# Patient Record
Sex: FEMALE | Race: WHITE | NOT HISPANIC OR LATINO
[De-identification: names, ages, dates, MRNs, and addresses within clinical notes are randomized per-mention and may not be internally consistent; named-entity substitution may affect disease eponyms.]

---

## 2018-02-14 ENCOUNTER — RESULT REVIEW (OUTPATIENT)
Age: 48
End: 2018-02-14

## 2019-03-12 ENCOUNTER — RESULT REVIEW (OUTPATIENT)
Age: 49
End: 2019-03-12

## 2020-09-23 ENCOUNTER — RESULT REVIEW (OUTPATIENT)
Age: 50
End: 2020-09-23

## 2021-12-14 ENCOUNTER — RESULT REVIEW (OUTPATIENT)
Age: 51
End: 2021-12-14

## 2022-10-19 DIAGNOSIS — Z80.9 FAMILY HISTORY OF MALIGNANT NEOPLASM, UNSPECIFIED: ICD-10-CM

## 2022-10-19 DIAGNOSIS — Z86.19 PERSONAL HISTORY OF OTHER INFECTIOUS AND PARASITIC DISEASES: ICD-10-CM

## 2022-10-19 DIAGNOSIS — L60.3 NAIL DYSTROPHY: ICD-10-CM

## 2022-10-19 DIAGNOSIS — Z83.3 FAMILY HISTORY OF DIABETES MELLITUS: ICD-10-CM

## 2022-10-19 DIAGNOSIS — Z86.39 PERSONAL HISTORY OF OTHER ENDOCRINE, NUTRITIONAL AND METABOLIC DISEASE: ICD-10-CM

## 2022-10-19 DIAGNOSIS — Z82.49 FAMILY HISTORY OF ISCHEMIC HEART DISEASE AND OTHER DISEASES OF THE CIRCULATORY SYSTEM: ICD-10-CM

## 2022-10-19 PROBLEM — Z00.00 ENCOUNTER FOR PREVENTIVE HEALTH EXAMINATION: Status: ACTIVE | Noted: 2022-10-19

## 2022-11-12 ENCOUNTER — APPOINTMENT (OUTPATIENT)
Dept: PODIATRY | Facility: CLINIC | Age: 52
End: 2022-11-12

## 2022-11-12 DIAGNOSIS — M79.672 PAIN IN LEFT FOOT: ICD-10-CM

## 2022-11-12 DIAGNOSIS — M76.72 PERONEAL TENDINITIS, LEFT LEG: ICD-10-CM

## 2022-11-12 PROCEDURE — 99203 OFFICE O/P NEW LOW 30 MIN: CPT

## 2022-11-12 PROCEDURE — 73630 X-RAY EXAM OF FOOT: CPT | Mod: LT

## 2022-11-12 RX ORDER — MELOXICAM 15 MG/1
15 TABLET ORAL DAILY
Qty: 14 | Refills: 0 | Status: ACTIVE | COMMUNITY
Start: 2022-11-12 | End: 1900-01-01

## 2022-11-22 PROBLEM — M79.672 LEFT FOOT PAIN: Status: ACTIVE | Noted: 2022-11-18

## 2022-11-22 NOTE — HISTORY OF PRESENT ILLNESS
Nov 2021: seeing heme for several yrs for chronic anemia. on integra. no need for blood transfusion. last colonoscopy 10 yrs ago - normal as per pt. No family history of colon cancer / GI malignancies / IBD. occasional constipation. No Heartburn, Dysphagia, Melena, Rectal bleeding, Weight loss, Diarrhea,  Abdominal pain. [Sneakers] : hood [FreeTextEntry1] : Patient presents today with left lateral foot pain. It started about 5 or 6 weeks ago, roughly about the time that her  underwent surgery. She started to be more on her feet. She is very physically active at work. she is very physically active during the day, climbs stairs and is on her feet in steel toe boots. She has tried icing for this problem without much success. The pain is worse when she is on her feet all day. She denies any trauma or sprains to the area.

## 2022-11-22 NOTE — PROCEDURE
[FreeTextEntry1] : X-ray Report: Left foot - 3 views were performed which shows no fracture, dislocations, erosions or spurs.

## 2022-11-22 NOTE — ASSESSMENT
[FreeTextEntry1] : \par Impression: Pain in the left foot. Left peroneal tendonitis.\par \par Treatment: DIscussed etiology and treatment options with the patient. At this time I advised patient to continue wearing her steel toe boots. They lace up to the ankle. She prefers not to go into a CAM boot just yet because she would need to be off of work for that. She was placed on lighter duty. I also referred her to physical therapy. i advised her to ice, wear compression stockings for compression support and I gave her off-loading pads to use around the 5th metatarsal.  Patient was also place on Mobic, not to be taken with any other NSAID's and to be taken with food. I will see her back in 2 to 3 weeks. Will discuss if she needs a CAM boot and to be off of work at that time if she is not improving.

## 2022-11-22 NOTE — PHYSICAL EXAM
[2+] : left foot dorsalis pedis 2+ [Skin Color & Pigmentation] : normal skin color and pigmentation [Skin Turgor] : normal skin turgor [Skin Lesions] : no skin lesions [Sensation] : the sensory exam was normal to light touch and pinprick [No Focal Deficits] : no focal deficits [Deep Tendon Reflexes (DTR)] : deep tendon reflexes were 2+ and symmetric [Motor Exam] : the motor exam was normal [Ankle Swelling (On Exam)] : not present [Varicose Veins Of Lower Extremities] : not present [] : not present [FreeTextEntry3] : CFT < 3 seconds.  Temperature gradient normal. No telangiectasias. No discoloration.  [de-identified] : Left foot pain on palpation at the 5th metatarsal base in the insertion of the peroneus brevis. Muscle power is 5/5 of all pedal groups. ROM of all pedal joints is normal, non-crepitant and non-painful. There is no swelling or redness to the area. [Foot Ulcer] : no foot ulcer [Skin Induration] : no skin induration

## 2022-12-10 ENCOUNTER — APPOINTMENT (OUTPATIENT)
Dept: PODIATRY | Facility: CLINIC | Age: 52
End: 2022-12-10

## 2023-11-01 ENCOUNTER — APPOINTMENT (OUTPATIENT)
Dept: OBGYN | Facility: CLINIC | Age: 53
End: 2023-11-01
Payer: COMMERCIAL

## 2023-11-01 PROCEDURE — 82270 OCCULT BLOOD FECES: CPT

## 2023-11-01 PROCEDURE — 81002 URINALYSIS NONAUTO W/O SCOPE: CPT

## 2023-11-01 PROCEDURE — 76830 TRANSVAGINAL US NON-OB: CPT

## 2023-11-01 PROCEDURE — 99396 PREV VISIT EST AGE 40-64: CPT

## 2025-04-02 PROBLEM — L60.3 NAIL DYSTROPHY: Status: RESOLVED | Noted: 2022-10-19 | Resolved: 2025-04-02

## 2025-06-24 ENCOUNTER — APPOINTMENT (OUTPATIENT)
Dept: OBGYN | Facility: CLINIC | Age: 55
End: 2025-06-24
Payer: COMMERCIAL

## 2025-06-24 PROCEDURE — 99213 OFFICE O/P EST LOW 20 MIN: CPT | Mod: 25

## 2025-06-24 PROCEDURE — 56820 COLPOSCOPY VULVA: CPT
